# Patient Record
(demographics unavailable — no encounter records)

---

## 2024-10-14 NOTE — DISCUSSION/SUMMARY
[FreeTextEntry1] : Pt presents with sister for evaluation of heavy and painful menses.  Pt h/o of nonverbal developmental disorder. Unable to perform PE due to her hesitancy to stay in position and being touched.  Patient will be schedule for exam after medication.

## 2024-10-14 NOTE — HISTORY OF PRESENT ILLNESS
[Normal Amount/Duration] :  normal amount and duration [Regular Cycle Intervals] : periods have been regular [Menarche Age: ____] : age at menarche was [unfilled] [FreeTextEntry1] : 37 y  presents  sent by GP for history of heavy menses.  Limited information from patient due to Non-verbal development.  H/o obtained from patient's sister.

## 2024-10-14 NOTE — PHYSICAL EXAM
[Chaperone Present] : A chaperone was present in the examining room during all aspects of the physical examination [No Bleeding] : There was no active vaginal bleeding [FreeTextEntry3] : Unable to perform exam due pt's inability to stay in position or being touched  [FreeTextEntry2] : Developmental-non verbal.  Unable to perform exam

## 2024-10-14 NOTE — REASON FOR VISIT
[Annual] : an annual visit. [Dysmenorrhea] : dysmenorrhea [Menorrhagia] : menorrhagia [FreeTextEntry2] : heavy menses